# Patient Record
Sex: MALE | Race: OTHER | Employment: OTHER | URBAN - METROPOLITAN AREA
[De-identification: names, ages, dates, MRNs, and addresses within clinical notes are randomized per-mention and may not be internally consistent; named-entity substitution may affect disease eponyms.]

---

## 2017-02-03 NOTE — PATIENT DISCUSSION
Recommended surgery to repair lamellar macular hole only if worsens at this point. [place] [facility] for now.

## 2017-07-24 ENCOUNTER — NEW PATIENT (OUTPATIENT)
Dept: URBAN - METROPOLITAN AREA CLINIC 35 | Facility: CLINIC | Age: 77
End: 2017-07-24

## 2017-07-24 DIAGNOSIS — Z96.1: ICD-10-CM

## 2017-07-24 DIAGNOSIS — H35.373: ICD-10-CM

## 2017-07-24 DIAGNOSIS — H43.393: ICD-10-CM

## 2017-07-24 DIAGNOSIS — H26.491: ICD-10-CM

## 2017-07-24 PROCEDURE — G8428 CUR MEDS NOT DOCUMENT: HCPCS

## 2017-07-24 PROCEDURE — 92004 COMPRE OPH EXAM NEW PT 1/>: CPT

## 2017-07-24 PROCEDURE — G8756 NO BP MEASURE DOC: HCPCS

## 2017-07-24 PROCEDURE — 1036F TOBACCO NON-USER: CPT

## 2017-07-24 ASSESSMENT — VISUAL ACUITY
OD_SC: 20/50
OS_SC: 20/50
OS_CC: J2
OD_SC: J12
OS_CC: 20/30
OD_CC: 20/30-2
OD_CC: J2
OD_BAT: 20/70
OS_SC: J12
OS_BAT: 20/60

## 2017-07-24 ASSESSMENT — TONOMETRY
OD_IOP_MMHG: 10
OS_IOP_MMHG: 10

## 2017-08-09 ENCOUNTER — POST-OP (OUTPATIENT)
Dept: URBAN - METROPOLITAN AREA CLINIC 35 | Facility: CLINIC | Age: 77
End: 2017-08-09

## 2017-08-09 DIAGNOSIS — Z96.1: ICD-10-CM

## 2017-08-09 PROCEDURE — 99024 POSTOP FOLLOW-UP VISIT: CPT

## 2017-08-09 ASSESSMENT — TONOMETRY
OD_IOP_MMHG: 13
OS_IOP_MMHG: 13

## 2017-08-09 ASSESSMENT — VISUAL ACUITY
OD_CC: 20/20
OS_CC: 20/25
OD_SC: 20/25-1
OS_SC: 20/25-2

## 2018-10-08 ENCOUNTER — ESTABLISHED PATIENT (OUTPATIENT)
Dept: URBAN - METROPOLITAN AREA CLINIC 35 | Facility: CLINIC | Age: 78
End: 2018-10-08

## 2018-10-08 DIAGNOSIS — H35.373: ICD-10-CM

## 2018-10-08 DIAGNOSIS — H43.393: ICD-10-CM

## 2018-10-08 PROCEDURE — 92015 DETERMINE REFRACTIVE STATE: CPT

## 2018-10-08 PROCEDURE — G8756 NO BP MEASURE DOC: HCPCS

## 2018-10-08 PROCEDURE — G8427 DOCREV CUR MEDS BY ELIG CLIN: HCPCS

## 2018-10-08 PROCEDURE — 1036F TOBACCO NON-USER: CPT

## 2018-10-08 PROCEDURE — 92014 COMPRE OPH EXAM EST PT 1/>: CPT

## 2018-10-08 PROCEDURE — G9903 PT SCRN TBCO ID AS NON USER: HCPCS

## 2018-10-08 ASSESSMENT — TONOMETRY
OS_IOP_MMHG: 9
OD_IOP_MMHG: 8

## 2018-10-08 ASSESSMENT — VISUAL ACUITY
OD_SC: 20/40-1
OS_SC: 20/30-2
OD_SC: J8
OS_CC: J1
OD_CC: J1
OS_SC: J8
OD_CC: 20/25+2
OS_CC: 20/30+1

## 2024-01-15 ENCOUNTER — ESTABLISHED PATIENT (OUTPATIENT)
Dept: URBAN - METROPOLITAN AREA CLINIC 35 | Facility: CLINIC | Age: 84
End: 2024-01-15

## 2024-01-15 DIAGNOSIS — H53.2: ICD-10-CM

## 2024-01-15 DIAGNOSIS — H02.411: ICD-10-CM

## 2024-01-15 DIAGNOSIS — H43.393: ICD-10-CM

## 2024-01-15 DIAGNOSIS — H35.373: ICD-10-CM

## 2024-01-15 DIAGNOSIS — H52.7: ICD-10-CM

## 2024-01-15 PROCEDURE — 92014 COMPRE OPH EXAM EST PT 1/>: CPT

## 2024-01-15 PROCEDURE — 92015 DETERMINE REFRACTIVE STATE: CPT

## 2024-01-15 ASSESSMENT — TONOMETRY
OD_IOP_MMHG: 17
OS_IOP_MMHG: 16

## 2024-01-15 ASSESSMENT — VISUAL ACUITY
OS_CC: 20/40-2
OD_CC: 20/30
OU_CC: J1+

## 2024-01-24 ENCOUNTER — ESTABLISHED PATIENT (OUTPATIENT)
Dept: URBAN - METROPOLITAN AREA CLINIC 35 | Facility: CLINIC | Age: 84
End: 2024-01-24

## 2024-01-24 DIAGNOSIS — H02.834: ICD-10-CM

## 2024-01-24 DIAGNOSIS — H02.831: ICD-10-CM

## 2024-01-24 DIAGNOSIS — H02.832: ICD-10-CM

## 2024-01-24 DIAGNOSIS — H02.835: ICD-10-CM

## 2024-01-24 PROCEDURE — 92285 EXTERNAL OCULAR PHOTOGRAPHY: CPT

## 2024-01-24 PROCEDURE — 99214 OFFICE O/P EST MOD 30 MIN: CPT

## 2024-01-24 ASSESSMENT — VISUAL ACUITY
OD_CC: 20/30
OS_CC: 20/40-2

## 2025-08-25 ENCOUNTER — COMPREHENSIVE EXAM (OUTPATIENT)
Age: 85
End: 2025-08-25

## 2025-08-25 DIAGNOSIS — H02.832: ICD-10-CM

## 2025-08-25 DIAGNOSIS — H52.7: ICD-10-CM

## 2025-08-25 DIAGNOSIS — H40.1131: ICD-10-CM

## 2025-08-25 DIAGNOSIS — H01.002: ICD-10-CM

## 2025-08-25 DIAGNOSIS — H43.393: ICD-10-CM

## 2025-08-25 DIAGNOSIS — H53.2: ICD-10-CM

## 2025-08-25 DIAGNOSIS — H02.835: ICD-10-CM

## 2025-08-25 DIAGNOSIS — H02.831: ICD-10-CM

## 2025-08-25 DIAGNOSIS — H02.834: ICD-10-CM

## 2025-08-25 DIAGNOSIS — H02.411: ICD-10-CM

## 2025-08-25 DIAGNOSIS — H01.005: ICD-10-CM

## 2025-08-25 PROCEDURE — 99214 OFFICE O/P EST MOD 30 MIN: CPT

## 2025-08-25 PROCEDURE — 92015 DETERMINE REFRACTIVE STATE: CPT

## 2025-08-25 PROCEDURE — 92133 CPTRZD OPH DX IMG PST SGM ON: CPT
